# Patient Record
Sex: MALE | URBAN - METROPOLITAN AREA
[De-identification: names, ages, dates, MRNs, and addresses within clinical notes are randomized per-mention and may not be internally consistent; named-entity substitution may affect disease eponyms.]

---

## 2020-08-31 ENCOUNTER — NEW PATIENT (OUTPATIENT)
Dept: URBAN - METROPOLITAN AREA CLINIC 85 | Facility: CLINIC | Age: 70
End: 2020-08-31
Payer: COMMERCIAL

## 2020-08-31 DIAGNOSIS — H25.812 COMBINED FORMS OF AGE-RELATED CATARACT, LEFT EYE: Primary | ICD-10-CM

## 2020-08-31 DIAGNOSIS — H52.4 PRESBYOPIA: ICD-10-CM

## 2020-08-31 DIAGNOSIS — H21.532 IRIDODIALYSIS, LEFT EYE: ICD-10-CM

## 2020-08-31 DIAGNOSIS — H25.11 AGE-RELATED NUCLEAR CATARACT, RIGHT EYE: ICD-10-CM

## 2020-08-31 PROCEDURE — 92015 DETERMINE REFRACTIVE STATE: CPT | Performed by: OPTOMETRIST

## 2020-08-31 PROCEDURE — 92004 COMPRE OPH EXAM NEW PT 1/>: CPT | Performed by: OPTOMETRIST

## 2020-08-31 PROCEDURE — 92020 GONIOSCOPY: CPT | Performed by: OPTOMETRIST

## 2020-08-31 ASSESSMENT — VISUAL ACUITY
OD: 20/20
OS: 20/20

## 2020-08-31 ASSESSMENT — INTRAOCULAR PRESSURE
OD: 11
OS: 13

## 2020-08-31 ASSESSMENT — KERATOMETRY
OD: 42.50
OS: 42.25

## 2022-02-10 ENCOUNTER — OFFICE VISIT (OUTPATIENT)
Dept: URBAN - METROPOLITAN AREA CLINIC 128 | Facility: CLINIC | Age: 72
End: 2022-02-10
Payer: MEDICARE

## 2022-02-10 ENCOUNTER — WEB ENCOUNTER (OUTPATIENT)
Dept: URBAN - METROPOLITAN AREA CLINIC 128 | Facility: CLINIC | Age: 72
End: 2022-02-10

## 2022-02-10 DIAGNOSIS — Z12.11 SCREEN FOR COLON CANCER: ICD-10-CM

## 2022-02-10 PROCEDURE — 99203 OFFICE O/P NEW LOW 30 MIN: CPT | Performed by: INTERNAL MEDICINE

## 2022-02-10 RX ORDER — LISINOPRIL 40 MG/1
1 TABLET TABLET ORAL ONCE A DAY
Status: ACTIVE | COMMUNITY

## 2022-02-10 RX ORDER — AMLODIPINE BESYLATE 10 MG/1
1 TABLET TABLET ORAL ONCE A DAY
Status: ACTIVE | COMMUNITY

## 2022-02-10 RX ORDER — BUSPIRONE HYDROCHLORIDE 30 MG/1
1 TABLET TABLET ORAL TWICE A DAY
Status: ACTIVE | COMMUNITY

## 2022-02-10 RX ORDER — BACLOFEN 20 MG/1
1 TABLET ADMINISTER WITHOUT REGARDS TO MEALS AS NEEDED TABLET ORAL TWICE A DAY
Status: ACTIVE | COMMUNITY

## 2022-02-10 RX ORDER — POLYETHYLENE GLYCOL 3350, SODIUM CHLORIDE, SODIUM BICARBONATE AND POTASSIUM CHLORIDE 420G
AS DIRECTED KIT ORAL ONCE
Qty: 420 GRAM | Refills: 0 | OUTPATIENT
Start: 2022-02-10 | End: 2022-02-11

## 2022-02-10 RX ORDER — METOPROLOL TARTRATE 25 MG/1
1 TABLET WITH FOOD TABLET, FILM COATED ORAL TWICE A DAY
Status: ACTIVE | COMMUNITY

## 2022-02-10 NOTE — HPI-TODAY'S VISIT:
patient due for screening colonoscopy.  last one was 8 years ago with fair prep. no GI complaints other than a little constipated today. usually loose if not normal. no bleeding or weight loss.

## 2022-03-28 ENCOUNTER — TELEPHONE ENCOUNTER (OUTPATIENT)
Dept: URBAN - METROPOLITAN AREA CLINIC 6 | Facility: CLINIC | Age: 72
End: 2022-03-28

## 2022-04-15 ENCOUNTER — OFFICE VISIT (OUTPATIENT)
Dept: URBAN - METROPOLITAN AREA SURGERY CENTER 31 | Facility: SURGERY CENTER | Age: 72
End: 2022-04-15
Payer: MEDICARE

## 2022-04-15 DIAGNOSIS — Z12.11 COLON CANCER SCREENING: ICD-10-CM

## 2022-04-15 PROCEDURE — G0121 COLON CA SCRN NOT HI RSK IND: HCPCS | Performed by: INTERNAL MEDICINE

## 2022-04-15 PROCEDURE — G8907 PT DOC NO EVENTS ON DISCHARG: HCPCS | Performed by: INTERNAL MEDICINE

## 2022-04-21 ENCOUNTER — TELEPHONE ENCOUNTER (OUTPATIENT)
Dept: URBAN - METROPOLITAN AREA CLINIC 5 | Facility: CLINIC | Age: 72
End: 2022-04-21

## 2023-07-21 ENCOUNTER — OFFICE VISIT (OUTPATIENT)
Dept: URBAN - METROPOLITAN AREA CLINIC 128 | Facility: CLINIC | Age: 73
End: 2023-07-21
Payer: MEDICARE

## 2023-07-21 VITALS
HEART RATE: 87 BPM | SYSTOLIC BLOOD PRESSURE: 120 MMHG | HEIGHT: 68 IN | TEMPERATURE: 97.5 F | DIASTOLIC BLOOD PRESSURE: 80 MMHG | BODY MASS INDEX: 30.58 KG/M2 | WEIGHT: 201.8 LBS

## 2023-07-21 DIAGNOSIS — R19.4 CHANGE IN BOWEL HABIT: ICD-10-CM

## 2023-07-21 PROCEDURE — 99214 OFFICE O/P EST MOD 30 MIN: CPT | Performed by: INTERNAL MEDICINE

## 2023-07-21 RX ORDER — LISINOPRIL 40 MG/1
1 TABLET TABLET ORAL ONCE A DAY
Status: ACTIVE | COMMUNITY

## 2023-07-21 RX ORDER — METOPROLOL TARTRATE 25 MG/1
1 TABLET WITH FOOD TABLET, FILM COATED ORAL TWICE A DAY
Status: ACTIVE | COMMUNITY

## 2023-07-21 RX ORDER — BUSPIRONE HYDROCHLORIDE 30 MG/1
1 TABLET TABLET ORAL TWICE A DAY
Status: ACTIVE | COMMUNITY

## 2023-07-21 RX ORDER — BACLOFEN 20 MG/1
1 TABLET ADMINISTER WITHOUT REGARDS TO MEALS AS NEEDED TABLET ORAL TWICE A DAY
Status: DISCONTINUED | COMMUNITY

## 2023-07-21 RX ORDER — AMLODIPINE BESYLATE 10 MG/1
1 TABLET TABLET ORAL ONCE A DAY
Status: DISCONTINUED | COMMUNITY

## 2023-07-21 NOTE — HPI-TODAY'S VISIT:
Mr. Kearney is a 72 year old male who presents to GI clinic for diarrhea for approximately 1 month.  On 4/15/2022 screening colonoscopy with Dr. Hurd showed small internal hemorrhoids and normal terminal ileum. Repeat colonoscopy was recommended in 10 years.   He reports his diarrhea; he reports his bristol stool scale is 6-7. He reports having 1-2 BMs/day with lots of flatulence.

## 2023-07-24 ENCOUNTER — OFFICE VISIT (OUTPATIENT)
Dept: URBAN - METROPOLITAN AREA TELEHEALTH 2 | Facility: TELEHEALTH | Age: 73
End: 2023-07-24
Payer: MEDICARE

## 2023-07-24 VITALS — HEIGHT: 68 IN | WEIGHT: 201 LBS | BODY MASS INDEX: 30.46 KG/M2

## 2023-07-24 DIAGNOSIS — K58.1 IBS: ICD-10-CM

## 2023-07-24 DIAGNOSIS — E66.9 OBESITY: ICD-10-CM

## 2023-07-24 PROCEDURE — 97802 MEDICAL NUTRITION INDIV IN: CPT | Performed by: DIETITIAN, REGISTERED

## 2023-07-24 RX ORDER — BUSPIRONE HYDROCHLORIDE 30 MG/1
1 TABLET TABLET ORAL TWICE A DAY
Status: ACTIVE | COMMUNITY

## 2023-07-24 RX ORDER — METOPROLOL TARTRATE 25 MG/1
1 TABLET WITH FOOD TABLET, FILM COATED ORAL TWICE A DAY
Status: ACTIVE | COMMUNITY

## 2023-07-24 RX ORDER — LISINOPRIL 40 MG/1
1 TABLET TABLET ORAL ONCE A DAY
Status: ACTIVE | COMMUNITY

## 2023-07-30 LAB
ADENOVIRUS F 40/41: NOT DETECTED
CAMPYLOBACTER: NOT DETECTED
CLOSTRIDIUM DIFFICILE: NOT DETECTED
ENTAMOEBA HISTOLYTICA: NOT DETECTED
ENTEROAGGREGATIVE E.COLI: NOT DETECTED
ENTEROTOXIGENIC E.COLI: NOT DETECTED
ESCHERICHIA COLI O157: NOT DETECTED
GIARDIA LAMBLIA: NOT DETECTED
NOROVIRUS GI/GII: NOT DETECTED
ROTAVIRUS A: NOT DETECTED
SALMONELLA SPP.: NOT DETECTED
SHIGA-LIKE TOXIN PRODUCING E.COLI: NOT DETECTED
SHIGELLA SPP. / ENTEROINVASIVE E.COLI: NOT DETECTED
VIBRIO PARAHAEMOLYTICUS: NOT DETECTED
VIBRIO SPP.: NOT DETECTED
YERSINIA ENTEROCOLITICA: NOT DETECTED

## 2023-08-14 ENCOUNTER — LAB OUTSIDE AN ENCOUNTER (OUTPATIENT)
Dept: URBAN - METROPOLITAN AREA CLINIC 19 | Facility: CLINIC | Age: 73
End: 2023-08-14

## 2023-08-14 ENCOUNTER — TELEPHONE ENCOUNTER (OUTPATIENT)
Dept: URBAN - METROPOLITAN AREA CLINIC 19 | Facility: CLINIC | Age: 73
End: 2023-08-14

## 2023-08-14 ENCOUNTER — OFFICE VISIT (OUTPATIENT)
Dept: URBAN - METROPOLITAN AREA CLINIC 19 | Facility: CLINIC | Age: 73
End: 2023-08-14
Payer: MEDICARE

## 2023-08-14 VITALS
DIASTOLIC BLOOD PRESSURE: 64 MMHG | WEIGHT: 199.6 LBS | TEMPERATURE: 98.1 F | SYSTOLIC BLOOD PRESSURE: 142 MMHG | BODY MASS INDEX: 30.25 KG/M2 | HEIGHT: 68 IN

## 2023-08-14 DIAGNOSIS — R19.4 CHANGE IN BOWEL HABIT: ICD-10-CM

## 2023-08-14 PROCEDURE — 99214 OFFICE O/P EST MOD 30 MIN: CPT | Performed by: NURSE PRACTITIONER

## 2023-08-14 RX ORDER — METOPROLOL TARTRATE 25 MG/1
1 TABLET WITH FOOD TABLET, FILM COATED ORAL TWICE A DAY
Status: ACTIVE | COMMUNITY

## 2023-08-14 RX ORDER — LISINOPRIL 40 MG/1
1 TABLET TABLET ORAL ONCE A DAY
Status: ACTIVE | COMMUNITY

## 2023-08-14 RX ORDER — BUSPIRONE HYDROCHLORIDE 30 MG/1
1 TABLET TABLET ORAL TWICE A DAY
Status: ACTIVE | COMMUNITY

## 2023-08-14 NOTE — HPI-TODAY'S VISIT:
Mr. Kearney is a 72 year old male who presents to GI clinic for follow up. Last seen in GI clinic by Dr. Newman on 2023 for diarrhea, change in bowel habits 1 month.         He reported diarrhea; bristol stool scale is 6-7. 1-2 BMs/day with lots of flatulence. GPP 22 ordered and was negative He recommended low FODMAP diet and consult with the dietitian which he has done Instructed on Citrucel and if ongoing diarrhea may add cholestyramine or Imodium if no relief in symptoms may repeat colonoscopy    Today, he reports #6 stools, so not as watery. Can go a few days before having a BM, sometimes painful to pass then once it starts passing it flushes out explosively and quickly. So just a little better. Taking Citrucel. No bloody or black stools. Reports lower abdominal cramps, comes and goes. He reports he has not had a normal BM since childhood.  Family Hx: Brother had colostomy but he is not sure why. Maternal Granfather  of stomach cancer.   Prior procedures:        On 4/15/2022 screening colonoscopy with Dr. Hurd showed small internal hemorrhoids and normal terminal ileum. Repeat colonoscopy was recommended in 10 years..

## 2023-08-30 LAB
CALPROTECTIN, FECAL: <5
PANCREATIC ELASTASE, FECAL: 327

## 2023-09-25 ENCOUNTER — OFFICE VISIT (OUTPATIENT)
Dept: URBAN - METROPOLITAN AREA SURGERY CENTER 31 | Facility: SURGERY CENTER | Age: 73
End: 2023-09-25

## 2023-10-16 ENCOUNTER — OFFICE VISIT (OUTPATIENT)
Dept: URBAN - METROPOLITAN AREA SURGERY CENTER 31 | Facility: SURGERY CENTER | Age: 73
End: 2023-10-16

## 2023-10-17 ENCOUNTER — OUT OF OFFICE VISIT (OUTPATIENT)
Dept: URBAN - METROPOLITAN AREA SURGERY CENTER 31 | Facility: SURGERY CENTER | Age: 73
End: 2023-10-17
Payer: MEDICARE

## 2023-10-17 ENCOUNTER — CLAIMS CREATED FROM THE CLAIM WINDOW (OUTPATIENT)
Dept: URBAN - METROPOLITAN AREA CLINIC 4 | Facility: CLINIC | Age: 73
End: 2023-10-17
Payer: MEDICARE

## 2023-10-17 ENCOUNTER — LAB OUTSIDE AN ENCOUNTER (OUTPATIENT)
Dept: URBAN - METROPOLITAN AREA CLINIC 19 | Facility: CLINIC | Age: 73
End: 2023-10-17

## 2023-10-17 ENCOUNTER — TELEPHONE ENCOUNTER (OUTPATIENT)
Dept: URBAN - METROPOLITAN AREA CLINIC 19 | Facility: CLINIC | Age: 73
End: 2023-10-17

## 2023-10-17 ENCOUNTER — OUT OF OFFICE VISIT (OUTPATIENT)
Dept: URBAN - METROPOLITAN AREA SURGERY CENTER 31 | Facility: SURGERY CENTER | Age: 73
End: 2023-10-17

## 2023-10-17 DIAGNOSIS — Z12.11 COLON CANCER SCREENING (HIGH RISK): ICD-10-CM

## 2023-10-17 DIAGNOSIS — K63.5 BENIGN COLON POLYPS: ICD-10-CM

## 2023-10-17 DIAGNOSIS — K57.30 DIVERTICULA, COLON: ICD-10-CM

## 2023-10-17 DIAGNOSIS — R19.7 ACUTE DIARRHEA: ICD-10-CM

## 2023-10-17 DIAGNOSIS — K63.5 BENIGN COLON POLYP: ICD-10-CM

## 2023-10-17 DIAGNOSIS — K63.89 OTHER SPECIFIED DISEASES OF INTESTINE: ICD-10-CM

## 2023-10-17 DIAGNOSIS — D12.2 ADENOMA OF ASCENDING COLON: ICD-10-CM

## 2023-10-17 DIAGNOSIS — K29.81 DUODENITIS WITH BLEEDING: ICD-10-CM

## 2023-10-17 DIAGNOSIS — R19.7 DIARRHEA: ICD-10-CM

## 2023-10-17 DIAGNOSIS — Z12.11 COLON CANCER SCREENING: ICD-10-CM

## 2023-10-17 DIAGNOSIS — K31.A0 GASTRIC INTESTINAL METAPLASIA, UNSPECIFIED: ICD-10-CM

## 2023-10-17 DIAGNOSIS — K29.80 ACUTE DUODENITIS: ICD-10-CM

## 2023-10-17 DIAGNOSIS — K31.89 OTHER DISEASES OF STOMACH AND DUODENUM: ICD-10-CM

## 2023-10-17 DIAGNOSIS — K29.60 ADENOPAPILLOMATOSIS GASTRICA: ICD-10-CM

## 2023-10-17 DIAGNOSIS — K57.30 ACQUIRED DIVERTICULOSIS OF COLON: ICD-10-CM

## 2023-10-17 PROCEDURE — 88341 IMHCHEM/IMCYTCHM EA ADD ANTB: CPT | Performed by: PATHOLOGY

## 2023-10-17 PROCEDURE — 45338 SIGMOIDOSCOPY W/TUMR REMOVE: CPT | Performed by: INTERNAL MEDICINE

## 2023-10-17 PROCEDURE — 45331 SIGMOIDOSCOPY AND BIOPSY: CPT | Performed by: INTERNAL MEDICINE

## 2023-10-17 PROCEDURE — 43239 EGD BIOPSY SINGLE/MULTIPLE: CPT | Performed by: INTERNAL MEDICINE

## 2023-10-17 PROCEDURE — 88342 IMHCHEM/IMCYTCHM 1ST ANTB: CPT | Performed by: PATHOLOGY

## 2023-10-17 PROCEDURE — 00813 ANES UPR LWR GI NDSC PX: CPT | Performed by: NURSE ANESTHETIST, CERTIFIED REGISTERED

## 2023-10-17 PROCEDURE — G8907 PT DOC NO EVENTS ON DISCHARG: HCPCS | Performed by: INTERNAL MEDICINE

## 2023-10-17 PROCEDURE — 88313 SPECIAL STAINS GROUP 2: CPT | Performed by: PATHOLOGY

## 2023-10-17 PROCEDURE — 88305 TISSUE EXAM BY PATHOLOGIST: CPT | Performed by: PATHOLOGY

## 2023-10-17 RX ORDER — BUSPIRONE HYDROCHLORIDE 30 MG/1
1 TABLET TABLET ORAL TWICE A DAY
Status: ACTIVE | COMMUNITY

## 2023-10-17 RX ORDER — METOPROLOL TARTRATE 25 MG/1
1 TABLET WITH FOOD TABLET, FILM COATED ORAL TWICE A DAY
Status: ACTIVE | COMMUNITY

## 2023-10-17 RX ORDER — LISINOPRIL 40 MG/1
1 TABLET TABLET ORAL ONCE A DAY
Status: ACTIVE | COMMUNITY

## 2023-10-24 ENCOUNTER — TELEPHONE ENCOUNTER (OUTPATIENT)
Dept: URBAN - METROPOLITAN AREA CLINIC 19 | Facility: CLINIC | Age: 73
End: 2023-10-24

## 2023-10-24 PROBLEM — 4556007: Status: ACTIVE | Noted: 2023-10-24

## 2023-11-02 LAB — H PYLORI BREATH TEST: NOT DETECTED

## 2023-11-10 ENCOUNTER — OFFICE VISIT (OUTPATIENT)
Dept: URBAN - METROPOLITAN AREA CLINIC 19 | Facility: CLINIC | Age: 73
End: 2023-11-10
Payer: MEDICARE

## 2023-11-10 VITALS
OXYGEN SATURATION: 99 % | HEIGHT: 68 IN | BODY MASS INDEX: 30.31 KG/M2 | TEMPERATURE: 97.2 F | DIASTOLIC BLOOD PRESSURE: 78 MMHG | SYSTOLIC BLOOD PRESSURE: 144 MMHG | HEART RATE: 77 BPM | WEIGHT: 200 LBS

## 2023-11-10 DIAGNOSIS — Z86.010 HISTORY OF COLON POLYPS: ICD-10-CM

## 2023-11-10 DIAGNOSIS — K25.7: ICD-10-CM

## 2023-11-10 DIAGNOSIS — K29.80 ACUTE DUODENITIS: ICD-10-CM

## 2023-11-10 PROBLEM — 73481001: Status: ACTIVE | Noted: 2023-11-10

## 2023-11-10 PROCEDURE — 99213 OFFICE O/P EST LOW 20 MIN: CPT | Performed by: NURSE PRACTITIONER

## 2023-11-10 RX ORDER — LISINOPRIL 40 MG/1
1 TABLET TABLET ORAL ONCE A DAY
Status: ACTIVE | COMMUNITY

## 2023-11-10 RX ORDER — BUSPIRONE HYDROCHLORIDE 30 MG/1
1 TABLET TABLET ORAL TWICE A DAY
Status: ACTIVE | COMMUNITY

## 2023-11-10 RX ORDER — METOPROLOL TARTRATE 25 MG/1
1 TABLET WITH FOOD TABLET, FILM COATED ORAL TWICE A DAY
Status: ACTIVE | COMMUNITY

## 2023-11-10 NOTE — HPI-TODAY'S VISIT:
Mr. Kearney is a 73 year old male who presents to GI clinic for follow up. Last seen in GI clinic 2023 for diarrhea, change in bowel habits. GPP stool studies including fecal calprotectin and pancreatic elastase were all normal.  EGD/colonoscopy was done by Dr. Newman on 10/17/2023. EGD-normal esophagus.  One 5 mm gastric ulcer with pigmented material.  Normal duodenum. Path:Duodenum-peptic duodenitis negative for celiac.  Stomach antrum biopsy-chronic inactive gastritis with histological changes suggestive of treated H. pylori.  Foveolar hyperplasia. Colonoscopy-3 mm (benign mucosal) polyp in the cecum, 5 mm (TA) polyp in the ascending colon, 6 mm (inflammatory pseudopolyp) polyp in the transverse colon, diverticulosis in the sigmoid colon, internal hemorrhoids. Path: Random colon biopsy was negative He does not ever recall having H. pylori infection or treatment in the past. 10/31/2023 H. pylori breath test was negative He was started on Protonix 40 mg twice daily with plans to repeat EGD to assess ulcer healing and this is scheduled for 2024 with Dr. Newman He reports diarrhea has resolved.     Family Hx: Brother had colostomy but he is not sure why. Maternal Granfather  of stomach cancer. Prior procedures:         On 4/15/2022 screening colonoscopy with Dr. Hurd showed small internal hemorrhoids and normal terminal ileum. 10 yr f/u given.

## 2024-01-02 ENCOUNTER — OFFICE VISIT (OUTPATIENT)
Dept: URBAN - METROPOLITAN AREA SURGERY CENTER 31 | Facility: SURGERY CENTER | Age: 74
End: 2024-01-02

## 2024-01-25 ENCOUNTER — OFFICE VISIT (OUTPATIENT)
Dept: URBAN - METROPOLITAN AREA SURGERY CENTER 31 | Facility: SURGERY CENTER | Age: 74
End: 2024-01-25
Payer: MEDICARE

## 2024-01-25 ENCOUNTER — TELEPHONE ENCOUNTER (OUTPATIENT)
Dept: URBAN - METROPOLITAN AREA CLINIC 19 | Facility: CLINIC | Age: 74
End: 2024-01-25

## 2024-01-25 ENCOUNTER — CLAIMS CREATED FROM THE CLAIM WINDOW (OUTPATIENT)
Dept: URBAN - METROPOLITAN AREA CLINIC 4 | Facility: CLINIC | Age: 74
End: 2024-01-25
Payer: MEDICARE

## 2024-01-25 DIAGNOSIS — K31.A0 GASTRIC INTESTINAL METAPLASIA, UNSPECIFIED: ICD-10-CM

## 2024-01-25 DIAGNOSIS — R68.89 ERYTHEMATOUS MUCOSA: ICD-10-CM

## 2024-01-25 DIAGNOSIS — K29.60 ADENOPAPILLOMATOSIS GASTRICA: ICD-10-CM

## 2024-01-25 DIAGNOSIS — K27.3 ACUTE PEPTIC ULCER: ICD-10-CM

## 2024-01-25 DIAGNOSIS — Z87.11 H/O GASTRIC ULCER: ICD-10-CM

## 2024-01-25 PROCEDURE — 88342 IMHCHEM/IMCYTCHM 1ST ANTB: CPT | Performed by: PATHOLOGY

## 2024-01-25 PROCEDURE — 00731 ANES UPR GI NDSC PX NOS: CPT | Performed by: NURSE ANESTHETIST, CERTIFIED REGISTERED

## 2024-01-25 PROCEDURE — 43239 EGD BIOPSY SINGLE/MULTIPLE: CPT | Performed by: INTERNAL MEDICINE

## 2024-01-25 PROCEDURE — G8907 PT DOC NO EVENTS ON DISCHARG: HCPCS | Performed by: INTERNAL MEDICINE

## 2024-01-25 PROCEDURE — 88305 TISSUE EXAM BY PATHOLOGIST: CPT | Performed by: PATHOLOGY

## 2024-01-25 RX ORDER — BUSPIRONE HYDROCHLORIDE 30 MG/1
1 TABLET TABLET ORAL TWICE A DAY
Status: ACTIVE | COMMUNITY

## 2024-01-25 RX ORDER — METOPROLOL TARTRATE 25 MG/1
1 TABLET WITH FOOD TABLET, FILM COATED ORAL TWICE A DAY
Status: ACTIVE | COMMUNITY

## 2024-01-25 RX ORDER — LISINOPRIL 40 MG/1
1 TABLET TABLET ORAL ONCE A DAY
Status: ACTIVE | COMMUNITY

## 2024-04-26 ENCOUNTER — OV EP (OUTPATIENT)
Dept: URBAN - METROPOLITAN AREA CLINIC 19 | Facility: CLINIC | Age: 74
End: 2024-04-26
Payer: MEDICARE

## 2024-04-26 VITALS
BODY MASS INDEX: 31.55 KG/M2 | WEIGHT: 208.2 LBS | HEART RATE: 47 BPM | SYSTOLIC BLOOD PRESSURE: 144 MMHG | DIASTOLIC BLOOD PRESSURE: 72 MMHG | HEIGHT: 68 IN | TEMPERATURE: 96.6 F

## 2024-04-26 DIAGNOSIS — K29.70 GASTRITIS, PRESENCE OF BLEEDING UNSPECIFIED, UNSPECIFIED CHRONICITY, UNSPECIFIED GASTRITIS TYPE: ICD-10-CM

## 2024-04-26 PROCEDURE — 99213 OFFICE O/P EST LOW 20 MIN: CPT | Performed by: NURSE PRACTITIONER

## 2024-04-26 RX ORDER — METOPROLOL TARTRATE 25 MG/1
1 TABLET WITH FOOD TABLET, FILM COATED ORAL TWICE A DAY
Status: ACTIVE | COMMUNITY

## 2024-04-26 RX ORDER — PANTOPRAZOLE SODIUM 20 MG/1
1 TABLET TABLET, DELAYED RELEASE ORAL ONCE A DAY
Qty: 90 TABLET | Refills: 3 | OUTPATIENT
Start: 2024-04-26

## 2024-04-26 RX ORDER — LISINOPRIL 40 MG/1
1 TABLET TABLET ORAL ONCE A DAY
Status: ACTIVE | COMMUNITY

## 2024-04-26 RX ORDER — BUSPIRONE HYDROCHLORIDE 30 MG/1
1 TABLET TABLET ORAL TWICE A DAY
Status: ACTIVE | COMMUNITY

## 2024-04-26 RX ORDER — PANTOPRAZOLE SODIUM 40 MG/1
1 TABLET TABLET, DELAYED RELEASE ORAL ONCE A DAY
Qty: 90 TABLET | Refills: 3 | Status: ACTIVE | COMMUNITY
Start: 2024-02-20

## 2024-04-26 NOTE — HPI-TODAY'S VISIT:
Mr. Kearney is a 72 yo male who presents for follow up. Last seen in GI clinic 11/10/2023 for follow-up of acute gastric ulcer.  He was started on pantoprazole 40 mg BID for 8 weeks. Repeat EGD to reassess was done on 2024 which revealed ulcer healing.  Chronic inactive gastritis. Pantoprazole decreased to 40mg/day.  He reports he ran out of script last week. Doing ok, has some occasional abdominal cramping. Has been constipated lately and having a hard time getting BMs out. Not taking anything for this. He reports he has been taking Ibuprofen for arthritis few times a week      Prior history: seen in GI clinic 2023 for diarrhea, change in bowel habits. GPP stool studies including fecal calprotectin and pancreatic elastase were all normal. EGD/colonoscopy was done by Dr. Newman on 10/17/2023. EGD-normal esophagus. One 5 mm gastric ulcer with pigmented material. Normal duodenum. Path:Duodenum-peptic duodenitis negative for celiac. Stomach antrum biopsy-chronic inactive gastritis with histological changes suggestive of treated H. pylori. Foveolar hyperplasia. Colonoscopy-3 mm (benign mucosal) polyp in the cecum, 5 mm (TA) polyp in the ascending colon, 6 mm (inflammatory pseudopolyp) polyp in the transverse colon, diverticulosis in the sigmoid colon, internal hemorrhoids. Path: Random colon biopsy was negative. 3 yr f/u given 10/31/2023 H. pylori breath test was negative He was started on Protonix 40 mg twice daily with plans to repeat EGD to assess ulcer      Family Hx: Brother had colostomy but he is not sure why. Maternal Granfather  of stomach cancer. Prior procedures:         On 4/15/2022 screening colonoscopy with Dr. Hurd showed small internal hemorrhoids and normal terminal ileum..

## 2024-04-26 NOTE — PHYSICAL EXAM GASTROINTESTINAL
Abdomen , soft, rounded, mild tenderness to LLQ,  nondistended , no guarding or rigidity , normal bowel sounds , Liver and Spleen,  no hepatosplenomegaly

## 2025-04-22 ENCOUNTER — OFFICE VISIT (OUTPATIENT)
Dept: URBAN - METROPOLITAN AREA CLINIC 19 | Facility: CLINIC | Age: 75
End: 2025-04-22
Payer: MEDICARE

## 2025-04-22 ENCOUNTER — DASHBOARD ENCOUNTERS (OUTPATIENT)
Age: 75
End: 2025-04-22

## 2025-04-22 DIAGNOSIS — R10.11 RUQ PAIN: ICD-10-CM

## 2025-04-22 DIAGNOSIS — K29.90 GASTRITIS AND DUODENITIS: ICD-10-CM

## 2025-04-22 PROCEDURE — 99214 OFFICE O/P EST MOD 30 MIN: CPT | Performed by: NURSE PRACTITIONER

## 2025-04-22 RX ORDER — PANTOPRAZOLE SODIUM 20 MG/1
1 TABLET TABLET, DELAYED RELEASE ORAL ONCE A DAY
Qty: 90 TABLET | Refills: 3
Start: 2024-04-26

## 2025-04-22 RX ORDER — PANTOPRAZOLE SODIUM 20 MG/1
1 TABLET TABLET, DELAYED RELEASE ORAL ONCE A DAY
Qty: 90 TABLET | Refills: 3 | Status: ACTIVE | COMMUNITY
Start: 2024-04-26

## 2025-04-22 RX ORDER — LISINOPRIL 40 MG/1
1 TABLET TABLET ORAL ONCE A DAY
Status: ACTIVE | COMMUNITY

## 2025-04-22 RX ORDER — METOPROLOL TARTRATE 25 MG/1
1 TABLET WITH FOOD TABLET, FILM COATED ORAL TWICE A DAY
Status: ACTIVE | COMMUNITY

## 2025-04-22 NOTE — HPI-TODAY'S VISIT:
Mr. Kearney is a 75 yo male who presents for year follow up.   Last seen in GI clinic 2024  Pantoprazole was decreased to 20 mg/day he was encouraged to stop taking ibuprofen which he has been doing. He has been taking it daily and doing very well. Last few days has been having RUQ sharp pains that lasts a few seconds like knife. Happens 2-3 times/day. Feels most when walking or movement. Has been seeing a Hematologist for high ferritin Had labs last week and was told everything "looked good." He is going for diagnostic mammogram in a few weeks for finding of a new lump that is tender to touch      Prior history: -On 4/15/2022 screening colonoscopy with Dr. Hurd showed small internal hemorrhoids and normal terminal ileum. -Seen in GI clinic 2023 for diarrhea, change in bowel habits. -GPP stool studies including fecal calprotectin and pancreatic elastase were all normal.  -EGD/colonoscopy was done by Dr. Newman on 10/17/2023. -EGD - normal esophagus. One 5 mm gastric ulcer with pigmented material. Normal duodenum. Path:Duodenum-peptic duodenitis negative for celiac. Stomach antrum biopsy-chronic inactive gastritis with histological changes suggestive of treated H. pylori. Foveolar hyperplasia. -Colonoscopy - 3 mm (benign mucosal) polyp in the cecum, 5 mm (TA) polyp in the ascending colon, 6 mm (inflammatory pseudopolyp) polyp in the transverse colon, diverticulosis in the sigmoid colon, internal hemorrhoids. Path: Random colon biopsy was negative. 3 yr f/u given -10/31/2023 H. pylori breath test was negative -He was started on Protonix 40 mg BID x 8 weeks -Repeat EGD was done on 2024 which revealed ulcer healing. Chronic inactive gastritis. -Pantoprazole decreased to 40mg/day.    Family Hx: Brother had colostomy but he is not sure why. Maternal Granfather  of stomach cancer...

## 2025-04-23 ENCOUNTER — TELEPHONE ENCOUNTER (OUTPATIENT)
Dept: URBAN - METROPOLITAN AREA CLINIC 19 | Facility: CLINIC | Age: 75
End: 2025-04-23

## 2025-04-23 LAB
A/G RATIO: 1.6
ALBUMIN: 4.4
ALKALINE PHOSPHATASE: 90
ALT (SGPT): 18
AST (SGOT): 16
BILIRUBIN, TOTAL: 0.5
BUN/CREATININE RATIO: (no result)
BUN: 21
CALCIUM: 10
CARBON DIOXIDE, TOTAL: 30
CHLORIDE: 99
CREATININE: 1.26
EGFR: 61
GLOBULIN, TOTAL: 2.7
GLUCOSE: 88
HEMATOCRIT: 40.2
HEMOGLOBIN: 13.6
INR: 1
LIPASE: 22
MCH: 32.7
MCHC: 33.8
MCV: 96.6
MPV: 9.8
PLATELET COUNT: 304
POTASSIUM: 4.2
PROTEIN, TOTAL: 7.1
PT: 10.3
RDW: 12.2
RED BLOOD CELL COUNT: 4.16
SODIUM: 139
WHITE BLOOD CELL COUNT: 9

## 2025-05-19 ENCOUNTER — OFFICE VISIT (OUTPATIENT)
Dept: URBAN - METROPOLITAN AREA CLINIC 19 | Facility: CLINIC | Age: 75
End: 2025-05-19

## 2025-05-19 ENCOUNTER — LAB OUTSIDE AN ENCOUNTER (OUTPATIENT)
Dept: URBAN - METROPOLITAN AREA CLINIC 19 | Facility: CLINIC | Age: 75
End: 2025-05-19

## 2025-05-29 ENCOUNTER — OFFICE VISIT (OUTPATIENT)
Dept: URBAN - METROPOLITAN AREA CLINIC 19 | Facility: CLINIC | Age: 75
End: 2025-05-29
Payer: MEDICARE

## 2025-05-29 DIAGNOSIS — K76.0 HEPATIC STEATOSIS: ICD-10-CM

## 2025-05-29 DIAGNOSIS — R10.11 RUQ PAIN: ICD-10-CM

## 2025-05-29 PROCEDURE — 99213 OFFICE O/P EST LOW 20 MIN: CPT | Performed by: NURSE PRACTITIONER

## 2025-05-29 RX ORDER — LISINOPRIL 40 MG/1
1 TABLET TABLET ORAL ONCE A DAY
Status: ACTIVE | COMMUNITY

## 2025-05-29 RX ORDER — METOPROLOL TARTRATE 25 MG/1
1 TABLET WITH FOOD TABLET, FILM COATED ORAL TWICE A DAY
Status: ACTIVE | COMMUNITY

## 2025-05-29 RX ORDER — PANTOPRAZOLE SODIUM 20 MG/1
1 TABLET TABLET, DELAYED RELEASE ORAL ONCE A DAY
Qty: 90 TABLET | Refills: 3 | Status: ACTIVE | COMMUNITY
Start: 2024-04-26

## 2025-05-29 NOTE — HPI-TODAY'S VISIT:
Mr. Kearney is a 74-year-old male with PMH of PUD who presents for follow-up.  He was last seen in GI clinic 2025.  He continues on pantoprazole 20 mg/day and he was encouraged to stop taking ibuprofen.  At the time he reported having sharp RUQ abdominal pain that would last a few seconds, sharp like a knife and would occur 2-3 times/day.  He felt it most when walking or with any movement.  Labs from 2025: WBC 9.0, Hg 13.6, MCV 96.6, , lipase normal 22, INR 1.0, CMP normal with normal LFTs. RUQ US was done on 2025 which showed normal gallbladder and bile ducts. Hepatic steatosis.  ETOH: one drink (wine, liquor) three times/week No herbal supplements.   He also reported he had a diagnostic mammogram for a new tender lump finding in his left breast area that was found to be benign.  He reports having no further RUQ pain, states it resolved completely. No other GI complaints. Here to review results.     --------------------- Prior history: -On 4/15/2022 screening colonoscopy with Dr. Hurd showed small internal hemorrhoids and normal terminal ileum. -Seen in GI clinic 2023 for diarrhea, change in bowel habits. -GPP stool studies including fecal calprotectin and pancreatic elastase were all normal. -EGD/colonoscopy was done by Dr. Newman on 10/17/2023. -EGD - normal esophagus. One 5 mm gastric ulcer with pigmented material. Normal duodenum. Path:Duodenum-peptic duodenitis negative for celiac. Stomach antrum biopsy-chronic inactive gastritis with histological changes suggestive of treated H. pylori. Foveolar hyperplasia. -Colonoscopy - 3 mm (benign mucosal) polyp in the cecum, 5 mm (TA) polyp in the ascending colon, 6 mm (inflammatory pseudopolyp) polyp in the transverse colon, diverticulosis in the sigmoid colon, internal hemorrhoids. Path: Random colon biopsy was negative. 3 yr f/u given -10/31/2023 H. pylori breath test was negative -He was started on Protonix 40 mg BID x 8 weeks -Repeat EGD was done on 2024 which revealed ulcer healing. Chronic inactive gastritis. -Pantoprazole decreased to 40mg/day and few months later decreased to 20mg/day.  Family Hx: Brother had colostomy but he is not sure why. Maternal Granfather  of stomach cancer.